# Patient Record
Sex: MALE | Race: OTHER | URBAN - METROPOLITAN AREA
[De-identification: names, ages, dates, MRNs, and addresses within clinical notes are randomized per-mention and may not be internally consistent; named-entity substitution may affect disease eponyms.]

---

## 2024-08-09 ENCOUNTER — OFFICE VISIT (OUTPATIENT)
Dept: SURGERY | Facility: CLINIC | Age: 44
End: 2024-08-09

## 2024-08-09 ENCOUNTER — TELEPHONE (OUTPATIENT)
Dept: SURGERY | Facility: CLINIC | Age: 44
End: 2024-08-09

## 2024-08-09 DIAGNOSIS — N43.3 HYDROCELE IN ADULT: Primary | ICD-10-CM

## 2024-08-09 DIAGNOSIS — N43.3 LEFT HYDROCELE: Primary | ICD-10-CM

## 2024-08-09 PROCEDURE — 99244 OFF/OP CNSLTJ NEW/EST MOD 40: CPT | Performed by: SURGERY

## 2024-08-09 RX ORDER — CLOBETASOL PROPIONATE 0.5 MG/G
0.05 CREAM TOPICAL 2 TIMES DAILY
COMMUNITY

## 2024-08-09 NOTE — PROGRESS NOTES
Urology Clinic Note - New Patient    Referring Provider:  No referring provider defined for this encounter.     Primary Care Provider:  No primary care provider on file.     Chief Complaint:   Left hydrocele    HPI:   Ziyad Tsang is a 44 year old Mongolian-speaking male with no PMH referred for left hydrocele.  He reports a history of varicocele surgery bilaterally 20 years ago.  He reports 1.5 years of progressive left testicular enlargement and discomfort.    On exam he has a moderate size left hydrocele.    PSA:  No results found for: \"PSA\", \"PERCENTPSA\", \"PSAS\", \"PSAULTRA\", \"QPSA\", \"PSATOT\", \"TOTPSADX\", \"TOTPSASCREEN\"     History:   No past medical history on file.    No past surgical history on file.    No family history on file.    Social History     Socioeconomic History    Marital status:        Medications (Active prior to today's visit):  No current outpatient medications on file.       Allergies:  Not on File      Review of Systems:   A comprehensive 10-point review of systems was completed.  Pertinent positives and negatives are noted in the the HPI.    Physical Exam:   CONSTITUTIONAL: Well developed, well nourished, in no acute distress  NEUROLOGIC: Alert and oriented  HEAD: Normocephalic, atraumatic  EYES: Sclera non-icteric  ENT: Hearing intact, moist mucous membranes  NECK: No obvious goiter or masses  RESPIRATORY: Normal respiratory effort  SKIN: No evident rashes  ABDOMEN: Soft, non-tender, non-distended  GENITOURINARY: Normal phallus, orthotopic meatus, normal bilateral testicles, moderate left hydrocele    Assessment & Plan:   Ziyad Tsang is a 44 year old Mong[place]ian-speaking male with no PMH referred for left hydrocele.  He reports a history of varicocele surgery bilaterally 20 years ago.  He reports 1.5 years of progressive left testicular enlargement and discomfort.    On exam he has a moderate size left hydrocele.    -Scrotal ultrasound  -If scrotal ultrasound shows a normal testicle,  patient elects to proceed with left hydrocelectomy in the operating room      Thank you for this consult.    I have personally reviewed all relevant medical records, labs, and imaging.    Medical Decision Making  Left hydrocele: Undiagnosed new problem    Amount and/or Complexity of Data Reviewed  External Data Reviewed: labs and notes.  Radiology: ordered.    Risk  Minor surgery with identified risk factors.        Gerry Padilla MD  Staff Urologist  [facility]  Office: [phone]

## 2024-08-09 NOTE — TELEPHONE ENCOUNTER
Hi,    Can you please schedule this patient for surgery.    Needs a CBC, BMP, PT/INR 1-3 weeks prior.    Thanks,  Gerry     Urology Surgery Request  Surgeon: Gerry Padilla  Location (if known): [facility]  Procedure: Left hydrocelectomy  Anesthesia: General   Time Frame: Next available  Time required: 75 minutes  Diagnosis: Left hydrocele  Special Equipment:     Antibiotics: per hospital protocol unless checked below   ___ Levaquin 500 mg IV   ___ Gemcitabine 2 g/100 mL NS bladder instillation to be given in OR    Estimated Post Op/Follow Up Appt:   3-5 days with PA for drain removal.  4 weeks for postop with me. Please schedule appointment at time of surgery scheduling.

## 2024-08-09 NOTE — TELEPHONE ENCOUNTER
Spoke with the patient.  Scheduling the surgery for 9/16/24. I also scheduled a 3-5 day f/u with PA on 9/20/24 @11am. Also 4week f/u rhonda/MD 10/14/24 @ 11:15am   Reviewed the pre-op instructions and will send via My Chart or mail to patient once confirmed.  Patient can contact me at [phone]   31-Oct-2017 11:23

## 2024-08-29 ENCOUNTER — TELEPHONE (OUTPATIENT)
Dept: CASE MANAGEMENT | Age: 44
End: 2024-08-29

## 2024-08-29 NOTE — TELEPHONE ENCOUNTER
Good Afternoon    [facility]/ [facility] 9/16/24 at [facility] has denied surgery case.    Please call patient to cancel surgery on 9/16/24    No option for peer to peer was given.    Thank you    Tracy  [facility]                      
Good